# Patient Record
Sex: FEMALE | Race: WHITE | NOT HISPANIC OR LATINO | URBAN - METROPOLITAN AREA
[De-identification: names, ages, dates, MRNs, and addresses within clinical notes are randomized per-mention and may not be internally consistent; named-entity substitution may affect disease eponyms.]

---

## 2017-09-07 ENCOUNTER — EMERGENCY (EMERGENCY)
Facility: HOSPITAL | Age: 72
LOS: 1 days | Discharge: HOME | End: 2017-09-07
Attending: EMERGENCY MEDICINE | Admitting: EMERGENCY MEDICINE
Payer: COMMERCIAL

## 2017-09-07 VITALS
SYSTOLIC BLOOD PRESSURE: 134 MMHG | WEIGHT: 154.1 LBS | RESPIRATION RATE: 18 BRPM | TEMPERATURE: 98 F | DIASTOLIC BLOOD PRESSURE: 82 MMHG | HEART RATE: 59 BPM | OXYGEN SATURATION: 97 % | HEIGHT: 67 IN

## 2017-09-07 DIAGNOSIS — K57.90 DIVERTICULOSIS OF INTESTINE, PART UNSPECIFIED, WITHOUT PERFORATION OR ABSCESS WITHOUT BLEEDING: ICD-10-CM

## 2017-09-07 DIAGNOSIS — E78.5 HYPERLIPIDEMIA, UNSPECIFIED: ICD-10-CM

## 2017-09-07 DIAGNOSIS — R10.9 UNSPECIFIED ABDOMINAL PAIN: ICD-10-CM

## 2017-09-07 LAB
ALBUMIN SERPL ELPH-MCNC: 4.3 G/DL — SIGNIFICANT CHANGE UP (ref 3.3–5)
ALP SERPL-CCNC: 78 U/L — SIGNIFICANT CHANGE UP (ref 40–120)
ALT FLD-CCNC: <5 U/L — LOW (ref 10–45)
ANION GAP SERPL CALC-SCNC: 13 MMOL/L — SIGNIFICANT CHANGE UP (ref 5–17)
APPEARANCE UR: CLEAR — SIGNIFICANT CHANGE UP
APTT BLD: 30.1 SEC — SIGNIFICANT CHANGE UP (ref 27.5–37.4)
AST SERPL-CCNC: 16 U/L — SIGNIFICANT CHANGE UP (ref 10–40)
BASOPHILS NFR BLD AUTO: 0.1 % — SIGNIFICANT CHANGE UP (ref 0–2)
BILIRUB SERPL-MCNC: 0.6 MG/DL — SIGNIFICANT CHANGE UP (ref 0.2–1.2)
BILIRUB UR-MCNC: NEGATIVE — SIGNIFICANT CHANGE UP
BUN SERPL-MCNC: 11 MG/DL — SIGNIFICANT CHANGE UP (ref 7–23)
CALCIUM SERPL-MCNC: 9.2 MG/DL — SIGNIFICANT CHANGE UP (ref 8.4–10.5)
CHLORIDE SERPL-SCNC: 103 MMOL/L — SIGNIFICANT CHANGE UP (ref 96–108)
CO2 SERPL-SCNC: 26 MMOL/L — SIGNIFICANT CHANGE UP (ref 22–31)
COLOR SPEC: YELLOW — SIGNIFICANT CHANGE UP
CREAT SERPL-MCNC: 0.7 MG/DL — SIGNIFICANT CHANGE UP (ref 0.5–1.3)
DIFF PNL FLD: NEGATIVE — SIGNIFICANT CHANGE UP
EOSINOPHIL NFR BLD AUTO: 0.6 % — SIGNIFICANT CHANGE UP (ref 0–6)
GLUCOSE SERPL-MCNC: 91 MG/DL — SIGNIFICANT CHANGE UP (ref 70–99)
GLUCOSE UR QL: NEGATIVE — SIGNIFICANT CHANGE UP
HCT VFR BLD CALC: 42.8 % — SIGNIFICANT CHANGE UP (ref 34.5–45)
HGB BLD-MCNC: 14 G/DL — SIGNIFICANT CHANGE UP (ref 11.5–15.5)
INR BLD: 1.09 — SIGNIFICANT CHANGE UP (ref 0.88–1.16)
KETONES UR-MCNC: NEGATIVE — SIGNIFICANT CHANGE UP
LEUKOCYTE ESTERASE UR-ACNC: (no result)
LIDOCAIN IGE QN: 22 U/L — SIGNIFICANT CHANGE UP (ref 7–60)
LYMPHOCYTES # BLD AUTO: 30.6 % — SIGNIFICANT CHANGE UP (ref 13–44)
MCHC RBC-ENTMCNC: 30.7 PG — SIGNIFICANT CHANGE UP (ref 27–34)
MCHC RBC-ENTMCNC: 32.7 G/DL — SIGNIFICANT CHANGE UP (ref 32–36)
MCV RBC AUTO: 93.9 FL — SIGNIFICANT CHANGE UP (ref 80–100)
MONOCYTES NFR BLD AUTO: 7.2 % — SIGNIFICANT CHANGE UP (ref 2–14)
NEUTROPHILS NFR BLD AUTO: 61.5 % — SIGNIFICANT CHANGE UP (ref 43–77)
NITRITE UR-MCNC: NEGATIVE — SIGNIFICANT CHANGE UP
PH UR: 5.5 — SIGNIFICANT CHANGE UP (ref 5–8)
PLATELET # BLD AUTO: 176 K/UL — SIGNIFICANT CHANGE UP (ref 150–400)
POTASSIUM SERPL-MCNC: 3.8 MMOL/L — SIGNIFICANT CHANGE UP (ref 3.5–5.3)
POTASSIUM SERPL-SCNC: 3.8 MMOL/L — SIGNIFICANT CHANGE UP (ref 3.5–5.3)
PROT SERPL-MCNC: 7.8 G/DL — SIGNIFICANT CHANGE UP (ref 6–8.3)
PROT UR-MCNC: NEGATIVE MG/DL — SIGNIFICANT CHANGE UP
PROTHROM AB SERPL-ACNC: 12.1 SEC — SIGNIFICANT CHANGE UP (ref 9.8–12.7)
RBC # BLD: 4.56 M/UL — SIGNIFICANT CHANGE UP (ref 3.8–5.2)
RBC # FLD: 13.2 % — SIGNIFICANT CHANGE UP (ref 10.3–16.9)
SODIUM SERPL-SCNC: 142 MMOL/L — SIGNIFICANT CHANGE UP (ref 135–145)
SP GR SPEC: 1.01 — SIGNIFICANT CHANGE UP (ref 1–1.03)
UROBILINOGEN FLD QL: 0.2 E.U./DL — SIGNIFICANT CHANGE UP
WBC # BLD: 7.1 K/UL — SIGNIFICANT CHANGE UP (ref 3.8–10.5)
WBC # FLD AUTO: 7.1 K/UL — SIGNIFICANT CHANGE UP (ref 3.8–10.5)

## 2017-09-07 PROCEDURE — 99285 EMERGENCY DEPT VISIT HI MDM: CPT | Mod: 25

## 2017-09-07 PROCEDURE — 74177 CT ABD & PELVIS W/CONTRAST: CPT | Mod: 26

## 2017-09-07 PROCEDURE — 93010 ELECTROCARDIOGRAM REPORT: CPT

## 2017-09-07 RX ORDER — ACETAMINOPHEN 500 MG
975 TABLET ORAL ONCE
Qty: 0 | Refills: 0 | Status: COMPLETED | OUTPATIENT
Start: 2017-09-07 | End: 2017-09-07

## 2017-09-07 RX ORDER — METRONIDAZOLE 500 MG
500 TABLET ORAL ONCE
Qty: 0 | Refills: 0 | Status: COMPLETED | OUTPATIENT
Start: 2017-09-07 | End: 2017-09-07

## 2017-09-07 RX ORDER — IOHEXOL 300 MG/ML
50 INJECTION, SOLUTION INTRAVENOUS ONCE
Qty: 0 | Refills: 0 | Status: COMPLETED | OUTPATIENT
Start: 2017-09-07 | End: 2017-09-07

## 2017-09-07 RX ORDER — PIPERACILLIN AND TAZOBACTAM 4; .5 G/20ML; G/20ML
3.38 INJECTION, POWDER, LYOPHILIZED, FOR SOLUTION INTRAVENOUS ONCE
Qty: 0 | Refills: 0 | Status: COMPLETED | OUTPATIENT
Start: 2017-09-07 | End: 2017-09-08

## 2017-09-07 RX ORDER — SODIUM CHLORIDE 9 MG/ML
1000 INJECTION INTRAMUSCULAR; INTRAVENOUS; SUBCUTANEOUS ONCE
Qty: 0 | Refills: 0 | Status: COMPLETED | OUTPATIENT
Start: 2017-09-07 | End: 2017-09-07

## 2017-09-07 RX ORDER — CIPROFLOXACIN LACTATE 400MG/40ML
400 VIAL (ML) INTRAVENOUS ONCE
Qty: 0 | Refills: 0 | Status: DISCONTINUED | OUTPATIENT
Start: 2017-09-07 | End: 2017-09-07

## 2017-09-07 RX ADMIN — SODIUM CHLORIDE 500 MILLILITER(S): 9 INJECTION INTRAMUSCULAR; INTRAVENOUS; SUBCUTANEOUS at 19:44

## 2017-09-07 RX ADMIN — Medication 975 MILLIGRAM(S): at 19:44

## 2017-09-07 RX ADMIN — Medication 100 MILLIGRAM(S): at 23:22

## 2017-09-07 RX ADMIN — IOHEXOL 50 MILLILITER(S): 300 INJECTION, SOLUTION INTRAVENOUS at 19:44

## 2017-09-07 NOTE — ED PROVIDER NOTE - PHYSICAL EXAMINATION
CON: ao x 3, HENMT: clear oropharynx, soft neck, HEAD: atraumatic, CV: rrr, equal pulses b/l, RESP: cta b/l, GI: +BS, soft, tender luq and llq, no rebound, no guarding, SKIN: no rash, MSK: no edema, moving all extremities spontaneously, NEURO: no gross motor or sensory deficit

## 2017-09-07 NOTE — ED PROVIDER NOTE - PROGRESS NOTE DETAILS
ct prelim read as perf divertic, abx given, pt reevaluated, nontoxic appearing, but now w/ rebound on exam, surg consulted ct prelim read as perf divertic, abx given, pt reevaluated, nontoxic appearing, but persistently tenderness, w/ rebound tenderness, surg consulted attending CT read no evidence of perf, d/w surg, feels there's no indication for any intervention. pt reevaluated, still well appearing, tender but soft abd ct prelim read as perf divertic, abx given, pt reevaluated, nontoxic appearing, tender abd but no peritoneal sign on exam, given findings on CT, will consult surg

## 2017-09-07 NOTE — ED ADULT NURSE NOTE - OBJECTIVE STATEMENT
lower bilateral abdominal pain x 2 days, denies fever/chills, nausea/vomiting/diarrhea/chest pain 0/10, shortness of breathe, dysuria.

## 2017-09-07 NOTE — ED ADULT NURSE NOTE - CHPI ED SYMPTOMS NEG
no vomiting/no dysuria/no chills/no blood in stool/no fever/no burning urination/no hematuria/no nausea/no abdominal distension/no diarrhea

## 2017-09-07 NOTE — ED PROVIDER NOTE - OBJECTIVE STATEMENT
72 yof pw left sided abd pain x 2 day.  no nv.  no prior abd surg.  last bm was 1-2 days ago.  no urinary sx.  no fc.  sx: abd pain  a/w: no nvd  duration: 2 days  location: left abd  radiation: none  modifying/eliciting factors: none

## 2017-09-08 VITALS
HEART RATE: 53 BPM | TEMPERATURE: 97 F | RESPIRATION RATE: 18 BRPM | OXYGEN SATURATION: 97 % | SYSTOLIC BLOOD PRESSURE: 121 MMHG | DIASTOLIC BLOOD PRESSURE: 75 MMHG

## 2017-09-08 LAB — LACTATE SERPL-SCNC: 2.7 MMOL/L — HIGH (ref 0.5–2)

## 2017-09-08 PROCEDURE — 85730 THROMBOPLASTIN TIME PARTIAL: CPT

## 2017-09-08 PROCEDURE — 99284 EMERGENCY DEPT VISIT MOD MDM: CPT | Mod: 25

## 2017-09-08 PROCEDURE — 93005 ELECTROCARDIOGRAM TRACING: CPT

## 2017-09-08 PROCEDURE — 83605 ASSAY OF LACTIC ACID: CPT

## 2017-09-08 PROCEDURE — 36415 COLL VENOUS BLD VENIPUNCTURE: CPT

## 2017-09-08 PROCEDURE — 85610 PROTHROMBIN TIME: CPT

## 2017-09-08 PROCEDURE — 81001 URINALYSIS AUTO W/SCOPE: CPT

## 2017-09-08 PROCEDURE — 96374 THER/PROPH/DIAG INJ IV PUSH: CPT | Mod: XU

## 2017-09-08 PROCEDURE — 80053 COMPREHEN METABOLIC PANEL: CPT

## 2017-09-08 PROCEDURE — 83690 ASSAY OF LIPASE: CPT

## 2017-09-08 PROCEDURE — 96375 TX/PRO/DX INJ NEW DRUG ADDON: CPT | Mod: XU

## 2017-09-08 PROCEDURE — 85025 COMPLETE CBC W/AUTO DIFF WBC: CPT

## 2017-09-08 PROCEDURE — 74177 CT ABD & PELVIS W/CONTRAST: CPT

## 2017-09-08 RX ORDER — DOCUSATE SODIUM 100 MG
1 CAPSULE ORAL
Qty: 28 | Refills: 0 | OUTPATIENT
Start: 2017-09-08 | End: 2017-09-22

## 2017-09-08 RX ORDER — SIMVASTATIN 20 MG/1
0 TABLET, FILM COATED ORAL
Qty: 0 | Refills: 0 | COMMUNITY

## 2017-09-08 RX ADMIN — PIPERACILLIN AND TAZOBACTAM 200 GRAM(S): 4; .5 INJECTION, POWDER, LYOPHILIZED, FOR SOLUTION INTRAVENOUS at 00:12

## 2017-09-08 NOTE — CONSULT NOTE ADULT - SUBJECTIVE AND OBJECTIVE BOX
HPI:  72F, Hx of depression and HLD, no PSH. present to [facility] ED with lower abdominal pain for two days, sharp in nature, non radiating. worse with tight pants. last BM 2 days ago. denies f/c/n/v/sob/cp/dysuria. never had this pain before.  states that she had a colonoscopy more 10 yrs ago, was normal    ED vitals: 97.4   53    121/75    18    97%  on RA    was given cipro/flagyl and zosyn in ED. also a liter NS bolus for lactate of 2.7, came down to 0.6       PAST MEDICAL & SURGICAL HISTORY:  HLD (hyperlipidemia)  No significant past surgical history      REVIEW OF SYSTEMS  as per HPI      MEDICATIONS  (STANDING):    MEDICATIONS  (PRN):      Allergies    Cipro (Rash)    Intolerances        SOCIAL HISTORY:    FAMILY HISTORY:      Vital Signs Last 24 Hrs  T(C): 36.3 (08 Sep 2017 00:55), Max: 36.9 (07 Sep 2017 18:05)  T(F): 97.4 (08 Sep 2017 00:55), Max: 98.4 (07 Sep 2017 18:05)  HR: 53 (08 Sep 2017 00:55) (53 - 59)  BP: 121/75 (08 Sep 2017 00:55) (121/75 - 134/82)  BP(mean): --  RR: 18 (08 Sep 2017 00:55) (18 - 18)  SpO2: 97% (08 Sep 2017 00:55) (97% - 97%)    PHYSICAL EXAM:  Gen: NAD, AAOx3  Resp: CTA b/l, unlabored breathing  CVS: RRR  Abd: soft, ND, mild ttp in the mid lower abdomen, no rebound or gaurding  Ext: wwpx4        LABS:                        14.0   7.1   )-----------( 176      ( 07 Sep 2017 18:51 )             42.8     09-07    142  |  103  |  11  ----------------------------<  91  3.8   |  26  |  0.70    Ca    9.2      07 Sep 2017 18:51    TPro  7.8  /  Alb  4.3  /  TBili  0.6  /  DBili  x   /  AST  16  /  ALT  <5<L>  /  AlkPhos  78  09-07    PT/INR - ( 07 Sep 2017 18:51 )   PT: 12.1 sec;   INR: 1.09          PTT - ( 07 Sep 2017 18:51 )  PTT:30.1 sec  Urinalysis Basic - ( 07 Sep 2017 21:48 )    Color: Yellow / Appearance: Clear / S.010 / pH: x  Gluc: x / Ketone: NEGATIVE  / Bili: NEGATIVE / Urobili: 0.2 E.U./dL   Blood: x / Protein: NEGATIVE mg/dL / Nitrite: NEGATIVE   Leuk Esterase: Small / RBC: < 5 /HPF / WBC 5-10 /HPF   Sq Epi: x / Non Sq Epi: Few /HPF / Bacteria: Present /HPF        RADIOLOGY & ADDITIONAL STUDIES    < from: CT Abdomen and Pelvis w/ Oral Cont and w/ IV Cont (17 @ 21:16) >  Bowel/Peritoneum: Ingested contrast reaches the distal small bowel.   Thickened diverticulum arises from the distal sigmoid colon and   demonstrates surrounding fat stranding and adjacent colonic wall   thickening. No associated abscess or extraluminal gas. Additional   noninflamed sigmoid diverticula. No bowel obstruction. Abundant colonic   feces. Normal appendix. No ascites.     Impression:  Acute uncomplicated sigmoid diverticulitis.      < end of copied text > HPI:  72F, Hx of depression and HLD, no PSH. present to [facility] ED with lower abdominal pain for two days, sharp in nature, non radiating. worse with tight pants. last BM 2 days ago. denies f/c/n/v/sob/cp/dysuria. never had this pain before.  states that she had a colonoscopy more 10 yrs ago, was normal    ED vitals: 97.4   53    121/75    18    97%  on RA    was given cipro/flagyl and zosyn in ED. also a liter NS bolus for lactate of 2.7, came down to 0.6       PAST MEDICAL & SURGICAL HISTORY:  HLD (hyperlipidemia)  No significant past surgical history      REVIEW OF SYSTEMS  as per HPI      MEDICATIONS  (STANDING):    MEDICATIONS  (PRN):      Allergies    Cipro (Rash)    Intolerances      Vital Signs Last 24 Hrs  T(C): 36.3 (08 Sep 2017 00:55), Max: 36.9 (07 Sep 2017 18:05)  T(F): 97.4 (08 Sep 2017 00:55), Max: 98.4 (07 Sep 2017 18:05)  HR: 53 (08 Sep 2017 00:55) (53 - 59)  BP: 121/75 (08 Sep 2017 00:55) (121/75 - 134/82)  BP(mean): --  RR: 18 (08 Sep 2017 00:55) (18 - 18)  SpO2: 97% (08 Sep 2017 00:55) (97% - 97%)    PHYSICAL EXAM:  Gen: NAD, AAOx3  Resp: CTA b/l, unlabored breathing  CVS: RRR  Abd: soft, ND, mild ttp in the mid lower abdomen, no rebound or gaurding  Ext: wwpx4        LABS:                        14.0   7.1   )-----------( 176      ( 07 Sep 2017 18:51 )             42.8     -    142  |  103  |  11  ----------------------------<  91  3.8   |  26  |  0.70    Ca    9.2      07 Sep 2017 18:51    TPro  7.8  /  Alb  4.3  /  TBili  0.6  /  DBili  x   /  AST  16  /  ALT  <5<L>  /  AlkPhos  78      PT/INR - ( 07 Sep 2017 18:51 )   PT: 12.1 sec;   INR: 1.09          PTT - ( 07 Sep 2017 18:51 )  PTT:30.1 sec  Urinalysis Basic - ( 07 Sep 2017 21:48 )    Color: Yellow / Appearance: Clear / S.010 / pH: x  Gluc: x / Ketone: NEGATIVE  / Bili: NEGATIVE / Urobili: 0.2 E.U./dL   Blood: x / Protein: NEGATIVE mg/dL / Nitrite: NEGATIVE   Leuk Esterase: Small / RBC: < 5 /HPF / WBC 5-10 /HPF   Sq Epi: x / Non Sq Epi: Few /HPF / Bacteria: Present /HPF        RADIOLOGY & ADDITIONAL STUDIES    < from: CT Abdomen and Pelvis w/ Oral Cont and w/ IV Cont (17 @ 21:16) >  Bowel/Peritoneum: Ingested contrast reaches the distal small bowel.   Thickened diverticulum arises from the distal sigmoid colon and   demonstrates surrounding fat stranding and adjacent colonic wall   thickening. No associated abscess or extraluminal gas. Additional   noninflamed sigmoid diverticula. No bowel obstruction. Abundant colonic   feces. Normal appendix. No ascites.     Impression:  Acute uncomplicated sigmoid diverticulitis.      < end of copied text >

## 2017-09-08 NOTE — CONSULT NOTE ADULT - ASSESSMENT
72F, wp/w 72F, with uncomplicated diverticulitis. no leukocytosis. afebrile, mild ttp in the lower abd.    - no perforation on CT scan, no need for surgical admission or intervention  - recommend medicine consult, for possible admission for IV Abx vs PO Abx and home  - d/w Chief on call    Thank you

## 2019-02-13 PROBLEM — Z00.00 ENCOUNTER FOR PREVENTIVE HEALTH EXAMINATION: Status: ACTIVE | Noted: 2019-02-13

## 2019-02-13 PROBLEM — E78.5 HYPERLIPIDEMIA, UNSPECIFIED: Chronic | Status: ACTIVE | Noted: 2017-09-07

## 2019-02-27 ENCOUNTER — APPOINTMENT (OUTPATIENT)
Dept: UROLOGY | Facility: CLINIC | Age: 74
End: 2019-02-27
Payer: COMMERCIAL

## 2019-02-27 VITALS
DIASTOLIC BLOOD PRESSURE: 93 MMHG | HEART RATE: 67 BPM | OXYGEN SATURATION: 98 % | TEMPERATURE: 97.9 F | SYSTOLIC BLOOD PRESSURE: 129 MMHG | BODY MASS INDEX: 17.63 KG/M2 | WEIGHT: 119.05 LBS | HEIGHT: 69 IN

## 2019-02-27 DIAGNOSIS — Z86.59 PERSONAL HISTORY OF OTHER MENTAL AND BEHAVIORAL DISORDERS: ICD-10-CM

## 2019-02-27 DIAGNOSIS — Z78.9 OTHER SPECIFIED HEALTH STATUS: ICD-10-CM

## 2019-02-27 PROCEDURE — 99204 OFFICE O/P NEW MOD 45 MIN: CPT | Mod: 25

## 2019-02-27 PROCEDURE — 51798 US URINE CAPACITY MEASURE: CPT

## 2019-02-28 LAB
APPEARANCE: ABNORMAL
BACTERIA: ABNORMAL
BILIRUBIN URINE: NEGATIVE
BLOOD URINE: ABNORMAL
COLOR: NORMAL
GLUCOSE QUALITATIVE U: NEGATIVE
HYALINE CASTS: 0 /LPF
KETONES URINE: NEGATIVE
LEUKOCYTE ESTERASE URINE: ABNORMAL
MICROSCOPIC-UA: NORMAL
NITRITE URINE: NEGATIVE
PH URINE: 7
PROTEIN URINE: NEGATIVE
RED BLOOD CELLS URINE: 4 /HPF
SPECIFIC GRAVITY URINE: 1.01
SQUAMOUS EPITHELIAL CELLS: 2 /HPF
URINE COMMENTS: NORMAL
UROBILINOGEN URINE: NORMAL
WHITE BLOOD CELLS URINE: 145 /HPF

## 2019-03-02 NOTE — HISTORY OF PRESENT ILLNESS
[Urinary Retention] : urinary retention [Urinary Urgency] : urinary urgency [Urinary Frequency] : urinary frequency [Straining] : straining [Weak Stream] : weak stream [Intermittency] : intermittency [None] : None [FreeTextEntry1] : This is a 72yo female here for evaluation of urinary frequency, urgency and difficulty urinating. She senses incomplete emptying and straining. No prior urological evaluation. No history of UTI, gross hematuria. No incontinence.  [Urinary Incontinence] : no urinary incontinence [Dysuria] : no dysuria [Hematuria - Gross] : no gross hematuria

## 2019-03-02 NOTE — ASSESSMENT
[FreeTextEntry1] : 74yo female with overactive bladder symptoms and obstructive symptoms likely due to prolapse\par Reviewed findings with patient\par Will refer to Dr. Walsh for further evaluation\par Check U/A, urine culture

## 2019-03-02 NOTE — PHYSICAL EXAM
[General Appearance - Well Developed] : well developed [General Appearance - Well Nourished] : well nourished [Normal Appearance] : normal appearance [Well Groomed] : well groomed [General Appearance - In No Acute Distress] : no acute distress [Edema] : no peripheral edema [Respiration, Rhythm And Depth] : normal respiratory rhythm and effort [Exaggerated Use Of Accessory Muscles For Inspiration] : no accessory muscle use [Abdomen Soft] : soft [Abdomen Tenderness] : non-tender [Costovertebral Angle Tenderness] : no ~M costovertebral angle tenderness [Urethral Meatus] : normal urethra [FreeTextEntry1] : Grade II anterior prolapse, PVR = 116cc [Normal Station and Gait] : the gait and station were normal for the patient's age [] : no rash [No Focal Deficits] : no focal deficits [Oriented To Time, Place, And Person] : oriented to person, place, and time [Affect] : the affect was normal [Mood] : the mood was normal [Not Anxious] : not anxious

## 2019-03-04 DIAGNOSIS — Z87.440 PERSONAL HISTORY OF URINARY (TRACT) INFECTIONS: ICD-10-CM

## 2019-03-04 LAB — BACTERIA UR CULT: ABNORMAL

## 2019-03-08 ENCOUNTER — APPOINTMENT (OUTPATIENT)
Dept: UROGYNECOLOGY | Facility: CLINIC | Age: 74
End: 2019-03-08
Payer: COMMERCIAL

## 2019-03-08 ENCOUNTER — LABORATORY RESULT (OUTPATIENT)
Age: 74
End: 2019-03-08

## 2019-03-08 VITALS
WEIGHT: 119 LBS | BODY MASS INDEX: 17.63 KG/M2 | DIASTOLIC BLOOD PRESSURE: 80 MMHG | SYSTOLIC BLOOD PRESSURE: 120 MMHG | HEIGHT: 69 IN

## 2019-03-08 DIAGNOSIS — Z87.19 PERSONAL HISTORY OF OTHER DISEASES OF THE DIGESTIVE SYSTEM: ICD-10-CM

## 2019-03-08 DIAGNOSIS — Z78.9 OTHER SPECIFIED HEALTH STATUS: ICD-10-CM

## 2019-03-08 PROCEDURE — 99204 OFFICE O/P NEW MOD 45 MIN: CPT | Mod: 25

## 2019-03-08 PROCEDURE — 51798 US URINE CAPACITY MEASURE: CPT

## 2019-03-08 NOTE — ASSESSMENT
[FreeTextEntry1] : Today's findings were discussed with the pt and written pamphlets were provided for vaginal prolapse and urinary incontinence.  Various tx options including pessary and surgery were reviewed.  She will consider the options and f/u in 2-4 wks.\par

## 2019-03-08 NOTE — LETTER BODY
[Dear  ___] : Dear  [unfilled], [I had the pleasure of evaluating your patient, [unfilled]. Thank you for referring Ms. [unfilled] for consultation for ___] : I had the pleasure of evaluating your patient, [unfilled]. Thank you for referring Ms. [unfilled] for consultation for [unfilled]. [Attached please find my note.] : Attached please find my note. [Thank you very much for allowing me to participate in the care of this patient. If you have any questions, please do not hesitate to contact me] : Thank you very much for allowing me to participate in the care of this patient. If you have any questions, please do not hesitate to contact me. [FreeTextEntry1] : UV prolapse and urgency UI

## 2019-03-08 NOTE — HISTORY OF PRESENT ILLNESS
[FreeTextEntry1] : The pt is a 72 y/o P1 who presents with bothersome urgency UI for 1 month, bothersome level is 8 /10.\par She wears >2-3 PPD. Denies enuresis or ERIKA.\par She feels incomplete emptying of her bladder and splints to completely evacuate.\par She voids Q 2-3 hrs with a small volume and denies having nocturia\par Her liquid intake consists of 6 glasses water, 1 cup of coffee, 1 glass of juice, \par She reports vaginal bulge for 6 yrs, bothersome 7/10\par She has a BM QD and feels complete evacuation and does not splint.\par She denies gross hematuria, recurrent UTIs, hx of nephrolithiaisis,\par Her last PAP was 2018 , and denies a hx of abnormal PAP\par Her last intercourse was >5 yrs ago due to  not being interested and she also lost her libido.  She is not interested in having intercourse in the future.\par She denies having hx of surgery.\par She is [place] and lived in [place] for many years prior to relocating to [place] in 1970s.\par

## 2019-03-08 NOTE — CHIEF COMPLAINT
[Questionnaire Received] : Patient questionnaire received [Falling Pelvic Organs] : falling pelvic organs [Poor/Slow Urine Flow] : poor/slow urine flow

## 2019-03-08 NOTE — PHYSICAL EXAM
[No Acute Distress] : in no acute distress [Well developed] : well developed [Well Nourished] : ~L well nourished [Good Hygeine] : demonstrates good hygeine [Oriented x3] : oriented to person, place, and time [Normal Memory] : ~T memory was ~L unimpaired [Normal Mood/Affect] : mood and affect are normal [Normal Lung Sounds] : the lungs were clear to auscultation [Respirations regular] : ~T respiratory rate was regular [Rate & Rhythm Regular] : ~T heart rate and rhythm were normal [No Edema] : ~T edema was not present [Supple] : ~T the neck demonstrated no ~M decrease in suppleness [Thyroid Normal] : the thyroid ~T showed no abnormalities [Symmetrical] : the neck was ~L symmetrical [Normal Gait] : gait was normal [Labia Majora] : were normal [Labia Minora] : were normal [Bartholin's Gland] : both Bartholin's glands were normal  [Normal Appearance] : general appearance was normal [Atrophy] : atrophy [Rectocele] : a rectocele [Cystocele] : a cystocele [Uterine Prolapse] : uterine prolapse [No Bleeding] : there was no active vaginal bleeding [Normal] : no abnormalities [Exam Deferred] : was deferred [Aa ____] : Aa [unfilled] [Ba ____] : Ba [unfilled] [C ____] : C [unfilled] [GH ____] : GH [unfilled] [PB ____] : PB [unfilled] [TVL ____] : TVL  [unfilled] [Ap ____] : Ap [unfilled] [Bp ____] : Bp [unfilled] [D ____] : D [unfilled] [] : II [Post Void Residual ____ml] : post void residual via catheterization was [unfilled] ml [1] : 1 [Anxiety] : patient is not anxious [Cough] : no cough [Dyspnea] : no dyspnea [Murmurs] : no murmurs were heard [Varicose Veins] : no varicose veins observed [Tracheal Deviation] : no tracheal deviation observed [Mass] : no ~M [unfilled] neck mass was observed [Mass (___ Cm)] : no ~M [unfilled] abdominal mass was palpated [Tenderness] : ~T no ~M abdominal tenderness observed [Distended] : not distended [H/Smegaly] : no hepatosplenomegaly [Hernia] : no hernia observed [Estrogen Effect] : no estrogen effect was observed

## 2019-03-19 PROBLEM — Z87.19 HISTORY OF DIVERTICULITIS: Status: RESOLVED | Noted: 2019-03-19 | Resolved: 2019-03-19

## 2019-03-19 PROBLEM — Z78.9 SOCIAL ALCOHOL USE: Status: ACTIVE | Noted: 2019-03-19

## 2019-04-18 ENCOUNTER — APPOINTMENT (OUTPATIENT)
Dept: UROGYNECOLOGY | Facility: CLINIC | Age: 74
End: 2019-04-18
Payer: COMMERCIAL

## 2019-04-18 DIAGNOSIS — N81.9 FEMALE GENITAL PROLAPSE, UNSPECIFIED: ICD-10-CM

## 2019-04-18 DIAGNOSIS — N39.41 URGE INCONTINENCE: ICD-10-CM

## 2019-04-18 DIAGNOSIS — N32.81 OVERACTIVE BLADDER: ICD-10-CM

## 2019-04-18 DIAGNOSIS — R93.89 ABNORMAL FINDINGS ON DIAGNOSTIC IMAGING OF OTHER SPECIFIED BODY STRUCTURES: ICD-10-CM

## 2019-04-18 DIAGNOSIS — N95.2 POSTMENOPAUSAL ATROPHIC VAGINITIS: ICD-10-CM

## 2019-04-18 DIAGNOSIS — R33.9 RETENTION OF URINE, UNSPECIFIED: ICD-10-CM

## 2019-04-18 PROCEDURE — 99215 OFFICE O/P EST HI 40 MIN: CPT

## 2019-04-18 NOTE — HISTORY OF PRESENT ILLNESS
[FreeTextEntry1] : The pt is here to discuss surgical  tx options for  UV prolapse. \par She decided that pessary is not ideal.\par She is unsure about obliterative procedure although she is not sexually active.

## 2019-04-18 NOTE — ASSESSMENT
[FreeTextEntry1] : The pt was counselled regarding the risks/benefits and alternatives for the treatment of vaginal prolapse including  pessary and surgery.  For surgery, we discussed various routes of surgery including abdominal, vaginal, laparoscopic and robotic. She was provided with pamphlets for each type of surgery including:\par vaginal USLS, colpocleisis, robotic colpopexy for tx of UV prolapse\par TVT for tx of ERIKA\par \par I spent >40 min face to face time to discuss the details of each procedure but she was forgetful.  She was encouraged to bring a family member or a friend with the next visit.  She also understands that she will need to schedule UDS prior to surgery.\par

## 2019-05-16 ENCOUNTER — APPOINTMENT (OUTPATIENT)
Dept: UROGYNECOLOGY | Facility: CLINIC | Age: 74
End: 2019-05-16

## 2019-05-30 ENCOUNTER — APPOINTMENT (OUTPATIENT)
Dept: UROGYNECOLOGY | Facility: CLINIC | Age: 74
End: 2019-05-30

## 2020-08-10 ENCOUNTER — APPOINTMENT (OUTPATIENT)
Dept: NEUROLOGY | Facility: CLINIC | Age: 75
End: 2020-08-10
Payer: COMMERCIAL

## 2020-08-10 VITALS
BODY MASS INDEX: 20.59 KG/M2 | DIASTOLIC BLOOD PRESSURE: 86 MMHG | WEIGHT: 139 LBS | OXYGEN SATURATION: 100 % | HEART RATE: 54 BPM | TEMPERATURE: 98 F | SYSTOLIC BLOOD PRESSURE: 145 MMHG | HEIGHT: 69 IN

## 2020-08-10 DIAGNOSIS — R41.3 OTHER AMNESIA: ICD-10-CM

## 2020-08-10 PROCEDURE — 99203 OFFICE O/P NEW LOW 30 MIN: CPT

## 2020-08-10 RX ORDER — FLUOXETINE HYDROCHLORIDE 10 MG/1
10 TABLET ORAL DAILY
Refills: 0 | Status: ACTIVE | COMMUNITY

## 2020-08-10 RX ORDER — AMOXICILLIN AND CLAVULANATE POTASSIUM 875; 125 MG/1; MG/1
875-125 TABLET, COATED ORAL
Qty: 14 | Refills: 0 | Status: DISCONTINUED | COMMUNITY
Start: 2019-03-04 | End: 2020-08-10

## 2020-08-10 RX ORDER — ESTRADIOL 0.1 MG/G
0.1 CREAM VAGINAL
Qty: 1 | Refills: 2 | Status: DISCONTINUED | COMMUNITY
Start: 2019-03-08 | End: 2020-08-10

## 2020-08-10 RX ORDER — SIMVASTATIN 40 MG/1
40 TABLET, FILM COATED ORAL DAILY
Refills: 0 | Status: ACTIVE | COMMUNITY

## 2020-08-10 RX ORDER — ESTRADIOL 0.1 MG/G
0.1 CREAM VAGINAL
Qty: 42 | Refills: 2 | Status: DISCONTINUED | COMMUNITY
Start: 2019-03-08 | End: 2020-08-10

## 2020-10-13 ENCOUNTER — APPOINTMENT (OUTPATIENT)
Dept: NEUROLOGY | Facility: CLINIC | Age: 75
End: 2020-10-13

## 2020-12-21 PROBLEM — Z87.440 HISTORY OF URINARY TRACT INFECTION: Status: RESOLVED | Noted: 2019-03-04 | Resolved: 2020-12-21

## 2024-02-06 NOTE — ED ADULT TRIAGE NOTE - STATUS:
Informed patient that Dr. Pozo reviewed her recent blood work and he would like for her to continue current medications.  Patient verbalilized understanding.  
Applied

## 2024-08-26 NOTE — ED ADULT NURSE NOTE - EXTENSIONS OF SELF_ADULT
Shanique,    I have reviewed your ultrasound results. They can see the lump on ultrasound, but findings are indeterminate. They suggested either biopsy or follow up imaging to monitor. I would suggest biopsy since it's been present for 4 months, just to be on the safe side. I have placed a referral for you to have this done, you should get a phone call to schedule it.     Please let me know if you have any further questions otherwise I will reach out when I see biopsy results.    Take care,  Emma Carrasco PA-C on 8/26/2024 at 8:51 AM   None